# Patient Record
Sex: FEMALE | Race: BLACK OR AFRICAN AMERICAN | NOT HISPANIC OR LATINO
[De-identification: names, ages, dates, MRNs, and addresses within clinical notes are randomized per-mention and may not be internally consistent; named-entity substitution may affect disease eponyms.]

---

## 2018-03-08 PROBLEM — Z00.00 ENCOUNTER FOR PREVENTIVE HEALTH EXAMINATION: Status: ACTIVE | Noted: 2018-03-08

## 2018-03-14 ENCOUNTER — APPOINTMENT (OUTPATIENT)
Dept: BREAST CENTER | Facility: CLINIC | Age: 63
End: 2018-03-14
Payer: MEDICAID

## 2018-03-14 VITALS
HEIGHT: 66 IN | WEIGHT: 150 LBS | HEART RATE: 97 BPM | SYSTOLIC BLOOD PRESSURE: 157 MMHG | BODY MASS INDEX: 24.11 KG/M2 | TEMPERATURE: 97.6 F | DIASTOLIC BLOOD PRESSURE: 93 MMHG

## 2018-03-14 DIAGNOSIS — Z78.9 OTHER SPECIFIED HEALTH STATUS: ICD-10-CM

## 2018-03-14 PROCEDURE — 99205 OFFICE O/P NEW HI 60 MIN: CPT

## 2018-03-15 ENCOUNTER — OTHER (OUTPATIENT)
Age: 63
End: 2018-03-15

## 2018-03-26 ENCOUNTER — RESULT REVIEW (OUTPATIENT)
Age: 63
End: 2018-03-26

## 2018-03-26 ENCOUNTER — OUTPATIENT (OUTPATIENT)
Dept: OUTPATIENT SERVICES | Facility: HOSPITAL | Age: 63
LOS: 1 days | End: 2018-03-26
Payer: MEDICAID

## 2018-03-26 DIAGNOSIS — C50.912 MALIGNANT NEOPLASM OF UNSPECIFIED SITE OF LEFT FEMALE BREAST: ICD-10-CM

## 2018-03-26 PROCEDURE — 88321 CONSLTJ&REPRT SLD PREP ELSWR: CPT

## 2018-03-27 LAB — SURGICAL PATHOLOGY STUDY: SIGNIFICANT CHANGE UP

## 2018-04-04 ENCOUNTER — FORM ENCOUNTER (OUTPATIENT)
Age: 63
End: 2018-04-04

## 2018-04-05 ENCOUNTER — OUTPATIENT (OUTPATIENT)
Dept: OUTPATIENT SERVICES | Facility: HOSPITAL | Age: 63
LOS: 1 days | End: 2018-04-05
Payer: MEDICAID

## 2018-04-05 ENCOUNTER — APPOINTMENT (OUTPATIENT)
Dept: MRI IMAGING | Facility: HOSPITAL | Age: 63
End: 2018-04-05
Payer: MEDICAID

## 2018-04-05 ENCOUNTER — APPOINTMENT (OUTPATIENT)
Dept: ULTRASOUND IMAGING | Facility: HOSPITAL | Age: 63
End: 2018-04-05
Payer: MEDICAID

## 2018-04-05 ENCOUNTER — APPOINTMENT (OUTPATIENT)
Dept: MAMMOGRAPHY | Facility: HOSPITAL | Age: 63
End: 2018-04-05
Payer: MEDICAID

## 2018-04-05 PROCEDURE — 0159T: CPT | Mod: 26

## 2018-04-05 PROCEDURE — 76641 ULTRASOUND BREAST COMPLETE: CPT | Mod: 26,50

## 2018-04-05 PROCEDURE — 77065 DX MAMMO INCL CAD UNI: CPT | Mod: 26,LT

## 2018-04-05 PROCEDURE — 77059 MRI BREAST BILATERAL: CPT | Mod: 26

## 2018-04-06 PROCEDURE — 97116 GAIT TRAINING THERAPY: CPT

## 2018-04-06 PROCEDURE — A9585: CPT

## 2018-04-06 PROCEDURE — C8937: CPT

## 2018-04-06 PROCEDURE — 77049 MRI BREAST C-+ W/CAD BI: CPT

## 2018-04-06 PROCEDURE — 76641 ULTRASOUND BREAST COMPLETE: CPT

## 2018-04-06 PROCEDURE — 77065 DX MAMMO INCL CAD UNI: CPT

## 2018-04-09 ENCOUNTER — CHART COPY (OUTPATIENT)
Age: 63
End: 2018-04-09

## 2018-04-10 ENCOUNTER — CHART COPY (OUTPATIENT)
Age: 63
End: 2018-04-10

## 2018-04-12 ENCOUNTER — CHART COPY (OUTPATIENT)
Age: 63
End: 2018-04-12

## 2018-04-12 ENCOUNTER — FORM ENCOUNTER (OUTPATIENT)
Age: 63
End: 2018-04-12

## 2018-04-13 ENCOUNTER — OUTPATIENT (OUTPATIENT)
Dept: OUTPATIENT SERVICES | Facility: HOSPITAL | Age: 63
LOS: 1 days | End: 2018-04-13
Payer: MEDICAID

## 2018-04-13 ENCOUNTER — APPOINTMENT (OUTPATIENT)
Dept: ULTRASOUND IMAGING | Facility: HOSPITAL | Age: 63
End: 2018-04-13
Payer: MEDICAID

## 2018-04-13 ENCOUNTER — RESULT REVIEW (OUTPATIENT)
Age: 63
End: 2018-04-13

## 2018-04-13 PROCEDURE — 19083 BX BREAST 1ST LESION US IMAG: CPT | Mod: LT

## 2018-04-13 PROCEDURE — 77065 DX MAMMO INCL CAD UNI: CPT

## 2018-04-13 PROCEDURE — 88305 TISSUE EXAM BY PATHOLOGIST: CPT

## 2018-04-13 PROCEDURE — A4648: CPT

## 2018-04-13 PROCEDURE — 77065 DX MAMMO INCL CAD UNI: CPT | Mod: 26,LT

## 2018-04-13 PROCEDURE — 19083 BX BREAST 1ST LESION US IMAG: CPT

## 2018-04-17 LAB — SURGICAL PATHOLOGY STUDY: SIGNIFICANT CHANGE UP

## 2018-04-18 ENCOUNTER — CHART COPY (OUTPATIENT)
Age: 63
End: 2018-04-18

## 2018-04-18 ENCOUNTER — FORM ENCOUNTER (OUTPATIENT)
Age: 63
End: 2018-04-18

## 2018-04-18 NOTE — ASU PATIENT PROFILE, ADULT - TEACHING/LEARNING LEARNING PREFERENCES
written material/verbal instruction/individual instruction/audio written material/verbal instruction/individual instruction/skill demonstration/audio

## 2018-04-18 NOTE — ASU PATIENT PROFILE, ADULT - PSH
History of surgery  right index finger H/O left breast biopsy    History of surgery  right index finger

## 2018-04-19 ENCOUNTER — APPOINTMENT (OUTPATIENT)
Dept: ULTRASOUND IMAGING | Facility: HOSPITAL | Age: 63
End: 2018-04-19

## 2018-04-19 ENCOUNTER — RESULT REVIEW (OUTPATIENT)
Age: 63
End: 2018-04-19

## 2018-04-19 ENCOUNTER — APPOINTMENT (OUTPATIENT)
Dept: BREAST CENTER | Facility: HOSPITAL | Age: 63
End: 2018-04-19

## 2018-04-19 ENCOUNTER — OUTPATIENT (OUTPATIENT)
Dept: OUTPATIENT SERVICES | Facility: HOSPITAL | Age: 63
LOS: 1 days | Discharge: HOME | End: 2018-04-19
Payer: MEDICAID

## 2018-04-19 VITALS
SYSTOLIC BLOOD PRESSURE: 118 MMHG | DIASTOLIC BLOOD PRESSURE: 72 MMHG | HEART RATE: 76 BPM | OXYGEN SATURATION: 99 % | RESPIRATION RATE: 16 BRPM

## 2018-04-19 VITALS
HEART RATE: 80 BPM | DIASTOLIC BLOOD PRESSURE: 89 MMHG | SYSTOLIC BLOOD PRESSURE: 163 MMHG | OXYGEN SATURATION: 97 % | RESPIRATION RATE: 16 BRPM | WEIGHT: 160.5 LBS | HEIGHT: 66 IN | TEMPERATURE: 97 F

## 2018-04-19 DIAGNOSIS — Z98.890 OTHER SPECIFIED POSTPROCEDURAL STATES: Chronic | ICD-10-CM

## 2018-04-19 PROCEDURE — A4648: CPT

## 2018-04-19 PROCEDURE — 19285 PERQ DEV BREAST 1ST US IMAG: CPT | Mod: LT

## 2018-04-19 PROCEDURE — 88307 TISSUE EXAM BY PATHOLOGIST: CPT

## 2018-04-19 PROCEDURE — 19285 PERQ DEV BREAST 1ST US IMAG: CPT

## 2018-04-19 PROCEDURE — 77065 DX MAMMO INCL CAD UNI: CPT | Mod: 26,LT

## 2018-04-19 PROCEDURE — 77065 DX MAMMO INCL CAD UNI: CPT

## 2018-04-19 PROCEDURE — 14001 TIS TRNFR TRUNK 10.1-30SQCM: CPT | Mod: LT,GC

## 2018-04-19 PROCEDURE — 88360 TUMOR IMMUNOHISTOCHEM/MANUAL: CPT

## 2018-04-19 PROCEDURE — 88341 IMHCHEM/IMCYTCHM EA ADD ANTB: CPT

## 2018-04-19 PROCEDURE — 19499 UNLISTED PROCEDURE BREAST: CPT

## 2018-04-19 PROCEDURE — 19301 PARTIAL MASTECTOMY: CPT | Mod: LT,GC

## 2018-04-19 PROCEDURE — 76098 X-RAY EXAM SURGICAL SPECIMEN: CPT | Mod: 26

## 2018-04-19 PROCEDURE — 19125 EXCISION BREAST LESION: CPT | Mod: LT

## 2018-04-19 PROCEDURE — 76098 X-RAY EXAM SURGICAL SPECIMEN: CPT

## 2018-04-19 PROCEDURE — C1819: CPT

## 2018-04-19 RX ORDER — OXYCODONE HYDROCHLORIDE 5 MG/1
5 TABLET ORAL ONCE
Qty: 0 | Refills: 0 | Status: DISCONTINUED | OUTPATIENT
Start: 2018-04-19 | End: 2018-04-19

## 2018-04-19 RX ORDER — ONDANSETRON 8 MG/1
4 TABLET, FILM COATED ORAL
Qty: 0 | Refills: 0 | Status: DISCONTINUED | OUTPATIENT
Start: 2018-04-19 | End: 2018-04-19

## 2018-04-19 RX ADMIN — OXYCODONE HYDROCHLORIDE 5 MILLIGRAM(S): 5 TABLET ORAL at 16:38

## 2018-04-19 NOTE — BRIEF OPERATIVE NOTE - PROCEDURE
<<-----Click on this checkbox to enter Procedure Breast biopsy, left  04/19/2018    Active  [email or web address]

## 2018-04-19 NOTE — PACU DISCHARGE NOTE - COMMENTS
discharged instructions given by ALLIE ro  pt stable deneis any pain at this time  iv removed  pt left accompaneid by her family william

## 2018-04-24 PROBLEM — C50.919 MALIGNANT NEOPLASM OF UNSPECIFIED SITE OF UNSPECIFIED FEMALE BREAST: Chronic | Status: ACTIVE | Noted: 2018-04-18

## 2018-04-24 PROBLEM — I10 ESSENTIAL (PRIMARY) HYPERTENSION: Chronic | Status: ACTIVE | Noted: 2018-04-18

## 2018-04-25 LAB — SURGICAL PATHOLOGY STUDY: SIGNIFICANT CHANGE UP

## 2018-04-27 ENCOUNTER — APPOINTMENT (OUTPATIENT)
Dept: BREAST CENTER | Facility: CLINIC | Age: 63
End: 2018-04-27
Payer: MEDICAID

## 2018-04-27 VITALS
DIASTOLIC BLOOD PRESSURE: 88 MMHG | WEIGHT: 150 LBS | HEART RATE: 86 BPM | SYSTOLIC BLOOD PRESSURE: 134 MMHG | BODY MASS INDEX: 24.11 KG/M2 | HEIGHT: 66 IN

## 2018-04-27 DIAGNOSIS — R92.8 OTHER ABNORMAL AND INCONCLUSIVE FINDINGS ON DIAGNOSTIC IMAGING OF BREAST: ICD-10-CM

## 2018-04-27 PROCEDURE — 99024 POSTOP FOLLOW-UP VISIT: CPT

## 2018-05-02 ENCOUNTER — FORM ENCOUNTER (OUTPATIENT)
Age: 63
End: 2018-05-02

## 2018-05-03 ENCOUNTER — APPOINTMENT (OUTPATIENT)
Dept: MAMMOGRAPHY | Facility: HOSPITAL | Age: 63
End: 2018-05-03
Payer: MEDICAID

## 2018-05-03 ENCOUNTER — OUTPATIENT (OUTPATIENT)
Dept: OUTPATIENT SERVICES | Facility: HOSPITAL | Age: 63
LOS: 1 days | End: 2018-05-03
Payer: MEDICAID

## 2018-05-03 DIAGNOSIS — Z98.890 OTHER SPECIFIED POSTPROCEDURAL STATES: Chronic | ICD-10-CM

## 2018-05-03 PROCEDURE — 76641 ULTRASOUND BREAST COMPLETE: CPT | Mod: 26,LT

## 2018-05-03 PROCEDURE — 77065 DX MAMMO INCL CAD UNI: CPT

## 2018-05-03 PROCEDURE — 76641 ULTRASOUND BREAST COMPLETE: CPT

## 2018-05-03 PROCEDURE — G0279: CPT

## 2018-05-03 PROCEDURE — 77065 DX MAMMO INCL CAD UNI: CPT | Mod: 26,LT

## 2018-05-03 PROCEDURE — G0279: CPT | Mod: 26

## 2018-05-18 ENCOUNTER — APPOINTMENT (OUTPATIENT)
Age: 63
End: 2018-05-18
Payer: MEDICAID

## 2018-05-18 VITALS
BODY MASS INDEX: 26.36 KG/M2 | HEIGHT: 66 IN | HEART RATE: 79 BPM | WEIGHT: 164 LBS | OXYGEN SATURATION: 98 % | SYSTOLIC BLOOD PRESSURE: 125 MMHG | DIASTOLIC BLOOD PRESSURE: 86 MMHG | RESPIRATION RATE: 16 BRPM

## 2018-05-18 DIAGNOSIS — I10 ESSENTIAL (PRIMARY) HYPERTENSION: ICD-10-CM

## 2018-05-18 DIAGNOSIS — Z86.39 PERSONAL HISTORY OF OTHER ENDOCRINE, NUTRITIONAL AND METABOLIC DISEASE: ICD-10-CM

## 2018-05-18 PROCEDURE — 99204 OFFICE O/P NEW MOD 45 MIN: CPT | Mod: 25

## 2018-05-18 RX ORDER — ROSUVASTATIN CALCIUM 10 MG/1
10 TABLET, FILM COATED ORAL
Qty: 30 | Refills: 0 | Status: ACTIVE | COMMUNITY
Start: 2018-03-08

## 2018-05-18 RX ORDER — ROSUVASTATIN CALCIUM 5 MG/1
TABLET, FILM COATED ORAL
Refills: 0 | Status: DISCONTINUED | COMMUNITY
End: 2018-05-18

## 2018-06-13 RX ORDER — HYDROCODONE BITARTRATE AND ACETAMINOPHEN 5; 300 MG/1; MG/1
5-300 TABLET ORAL
Qty: 10 | Refills: 0 | Status: DISCONTINUED | COMMUNITY
Start: 2018-04-19 | End: 2018-06-13

## 2018-07-18 ENCOUNTER — APPOINTMENT (OUTPATIENT)
Dept: BREAST CENTER | Facility: CLINIC | Age: 63
End: 2018-07-18

## 2018-07-26 ENCOUNTER — APPOINTMENT (OUTPATIENT)
Dept: BREAST CENTER | Facility: CLINIC | Age: 63
End: 2018-07-26
Payer: MEDICAID

## 2018-07-26 VITALS
BODY MASS INDEX: 26.36 KG/M2 | DIASTOLIC BLOOD PRESSURE: 85 MMHG | SYSTOLIC BLOOD PRESSURE: 129 MMHG | HEART RATE: 84 BPM | WEIGHT: 164 LBS | HEIGHT: 66 IN

## 2018-07-26 DIAGNOSIS — Z12.31 ENCOUNTER FOR SCREENING MAMMOGRAM FOR MALIGNANT NEOPLASM OF BREAST: ICD-10-CM

## 2018-07-26 DIAGNOSIS — Z85.3 PERSONAL HISTORY OF MALIGNANT NEOPLASM OF BREAST: ICD-10-CM

## 2018-07-26 PROCEDURE — 99213 OFFICE O/P EST LOW 20 MIN: CPT

## 2018-08-22 ENCOUNTER — APPOINTMENT (OUTPATIENT)
Age: 63
End: 2018-08-22
Payer: MEDICAID

## 2018-08-22 PROCEDURE — 99024 POSTOP FOLLOW-UP VISIT: CPT

## 2018-08-22 RX ORDER — METOPROLOL SUCCINATE 25 MG/1
25 TABLET, EXTENDED RELEASE ORAL
Qty: 30 | Refills: 0 | Status: DISCONTINUED | COMMUNITY
Start: 2018-04-06 | End: 2018-08-22

## 2018-09-20 ENCOUNTER — APPOINTMENT (OUTPATIENT)
Dept: MAMMOGRAPHY | Facility: HOSPITAL | Age: 63
End: 2018-09-20
Payer: MEDICAID

## 2018-09-20 ENCOUNTER — OUTPATIENT (OUTPATIENT)
Dept: OUTPATIENT SERVICES | Facility: HOSPITAL | Age: 63
LOS: 1 days | End: 2018-09-20
Payer: MEDICAID

## 2018-09-20 ENCOUNTER — APPOINTMENT (OUTPATIENT)
Dept: ULTRASOUND IMAGING | Facility: HOSPITAL | Age: 63
End: 2018-09-20
Payer: MEDICAID

## 2018-09-20 DIAGNOSIS — Z98.890 OTHER SPECIFIED POSTPROCEDURAL STATES: Chronic | ICD-10-CM

## 2018-09-20 PROCEDURE — 77066 DX MAMMO INCL CAD BI: CPT

## 2018-09-20 PROCEDURE — 76641 ULTRASOUND BREAST COMPLETE: CPT

## 2018-09-20 PROCEDURE — 76641 ULTRASOUND BREAST COMPLETE: CPT | Mod: 26,50

## 2018-09-20 PROCEDURE — 77066 DX MAMMO INCL CAD BI: CPT | Mod: 26

## 2018-09-20 PROCEDURE — 77062 BREAST TOMOSYNTHESIS BI: CPT | Mod: 26

## 2018-09-20 PROCEDURE — G0279: CPT | Mod: 26

## 2018-09-20 PROCEDURE — G0279: CPT

## 2018-10-22 ENCOUNTER — CHART COPY (OUTPATIENT)
Age: 63
End: 2018-10-22

## 2018-11-01 ENCOUNTER — OUTPATIENT (OUTPATIENT)
Dept: OUTPATIENT SERVICES | Facility: HOSPITAL | Age: 63
LOS: 1 days | End: 2018-11-01
Payer: MEDICAID

## 2018-11-01 DIAGNOSIS — R00.2 PALPITATIONS: ICD-10-CM

## 2018-11-01 DIAGNOSIS — Z98.890 OTHER SPECIFIED POSTPROCEDURAL STATES: Chronic | ICD-10-CM

## 2018-11-01 PROCEDURE — 93306 TTE W/DOPPLER COMPLETE: CPT | Mod: 26

## 2018-11-01 PROCEDURE — 93306 TTE W/DOPPLER COMPLETE: CPT

## 2018-12-19 ENCOUNTER — APPOINTMENT (OUTPATIENT)
Dept: RADIATION ONCOLOGY | Facility: CLINIC | Age: 63
End: 2018-12-19
Payer: MEDICAID

## 2018-12-19 VITALS
SYSTOLIC BLOOD PRESSURE: 143 MMHG | WEIGHT: 161.4 LBS | DIASTOLIC BLOOD PRESSURE: 84 MMHG | RESPIRATION RATE: 16 BRPM | OXYGEN SATURATION: 98 % | BODY MASS INDEX: 26.05 KG/M2 | HEART RATE: 78 BPM

## 2018-12-19 PROCEDURE — 99214 OFFICE O/P EST MOD 30 MIN: CPT

## 2018-12-19 RX ORDER — NAPROXEN 500 MG/1
500 TABLET ORAL
Qty: 30 | Refills: 0 | Status: DISCONTINUED | COMMUNITY
Start: 2018-03-08 | End: 2018-12-19

## 2018-12-19 RX ORDER — TRAMADOL HYDROCHLORIDE 50 MG/1
50 TABLET, COATED ORAL
Refills: 0 | Status: ACTIVE | COMMUNITY
Start: 2018-12-19

## 2018-12-19 NOTE — VITALS
[Least Pain Intensity: 0/10] : 0/10 [90: Able to carry normal activity; minor signs or symptoms of disease.] : 90: Able to carry normal activity; minor signs or symptoms of disease.  [Maximal Pain Intensity: 7/10] : 7/10 [Pain Location: ___] : Pain Location: [unfilled] [NSAID/Non-Opioid] : NSAID/Non-Opioid

## 2018-12-20 NOTE — REVIEW OF SYSTEMS
[Edema Limbs: Grade 0] : Edema Limbs: Grade 0  [Fatigue: Grade 0] : Fatigue: Grade 0 [Localized Edema: Grade 0] : Localized Edema: Grade 0  [Breast Pain: Grade 0] : Breast Pain: Grade 0 [Negative] : Respiratory [Dysphagia] : no dysphagia [Skin Rash] : no skin rash [Swollen Glands] : no swollen glands [FreeTextEntry9] : as noted in HPI

## 2018-12-20 NOTE — DISEASE MANAGEMENT
[Pathological] : TNM Stage: p [0] : 0 [TTNM] : is [NTNM] : x [MTNM] : 0 [de-identified] : 5240 cGy [de-identified] : 5240 cGy [de-identified] : left breast

## 2018-12-20 NOTE — HISTORY OF PRESENT ILLNESS
[FreeTextEntry1] : Ms. Beckman completed radiation therapy to the left breast for a total of 5240 cGy/ 20 fractions from 6/7/18- 7/5/18.  She had an uncomplicated treatment course. \par \par 12/19/18- FOLLOW UP\par Ms. Beckman returns for routine follow up. When she was last seen on 8/22/18, she was doing well; plan was to continue follow up  with Talia Miranda and Saran._\par \par Bilateral mammo/ sono done 9/20/18 showed the following:\par 1. Postoperative and postradiation changes in the left breast. Continued six month follow up left breast mammogram is recommended.  BI-RADS 3. \par 2. Benign right breast mammographic findings without interval change. Negative right breast ultrasound.  \par \par Today, she notes induration left medial breast to palpation. Denies breast pain\par Notably, she states that she had a recent fall on a bus and has bilateral shoulder and left arm pain 7/10 for which she takes tramadol. She is receiving PT 2 x week. She has MRI  of neck/back  scheduled today.\par She states that she will follow up with Dr. Arenas in January.\par \par \par 8/22/18- PTE\par She saw Dr. Noonan on 7/26/18, plan was to do pratima/ sono in September. She met with medical oncologist, Dr. Miranda, and has decided against an AI at this time.\par \par Today, she reports she is feeling very well, energy level is good. Reports very mild breast pain at times, 1/10. Otherwise denies any symptoms to include fever, chills, cough, SOB. \par  \par \par ONCOLOGY HISTORY\par Ms. Leanne Beckman is a 63 year old female who presents today with  left breast DCIS\par On 8/14/17 pt had mammogram showing left breast microcalcifications. On 9/20/17 she had a left breast sono which showed a  0.4 x 0.4 x 0.4 cm calcification at 12:00 2 cm FN. On 12/12/17 she had a left breast biopsy at [facility].  Pathology  reviewed by [facility] showed DICS intermediate grade with central necrosis, ER/IN positive, Ki-67 3%. MRI done 4/5/18 showed a 1.4 cm enhancing lesion at 12;00, 5.7 cm FN. Linear non- mass enhancement extends anteriorly from lesion approximately 1.5 cm.\par \par She consulted with Dr. Noonan and had a repeat ultrasound guided left core biopsy to R/O an invasive component on 4/13/18  at 12:00, 5-7 cm FN which showed benign findings. She underwent  a left breast lumpectomy with Biozorb on 4/19/18. Pathology showed DCIS 1mm from inferior margin measuring 1.5 mm. Negative margins, nuclear grade ll-11, with necrosis. ER/IN positive. Immunochemical stains support DCIS. Notably Dr. Noonan recommended a reexcision as margins were less than 2 mm but pt. refused additional surgery. She also recommended a left mammo/sono as the area of concern was larger on her studies which showed \par was done on 5/3/18 which showed no residual lesion at the lumpectomy site.BI-RADS 3\par \par Today she feels well. She consulted with Dr. Miranda 5/17 who did not recommended chemo.

## 2018-12-20 NOTE — PHYSICAL EXAM
[General Appearance - Well Developed] : well developed [General Appearance - Alert] : alert [General Appearance - In No Acute Distress] : in no acute distress [de-identified] : slight darkening under left breast.  [] : no respiratory distress [Heart Rate And Rhythm] : heart rate and rhythm were normal [Heart Sounds] : normal S1 and S2 [Skin Color & Pigmentation] : normal skin color and pigmentation [Oriented To Time, Place, And Person] : oriented to person, place, and time [Cervical Lymph Nodes Enlarged Posterior Bilaterally] : posterior cervical [Cervical Lymph Nodes Enlarged Anterior Bilaterally] : anterior cervical [Supraclavicular Lymph Nodes Enlarged Bilaterally] : supraclavicular [Extraocular Movements] : extraocular movements were intact [Hearing Threshold Finger Rub Not [place]] : hearing was normal [Normal] : normal spine exam without palpable tenderness, no kyphosis or scoliosis [de-identified] :  Induration medial left breast to palpation, grade 1 hyperpigmentation, non tender, no dimpling

## 2019-04-30 ENCOUNTER — OUTPATIENT (OUTPATIENT)
Dept: OUTPATIENT SERVICES | Facility: HOSPITAL | Age: 64
LOS: 1 days | End: 2019-04-30
Payer: MEDICAID

## 2019-04-30 ENCOUNTER — APPOINTMENT (OUTPATIENT)
Age: 64
End: 2019-04-30

## 2019-04-30 DIAGNOSIS — Z98.890 OTHER SPECIFIED POSTPROCEDURAL STATES: Chronic | ICD-10-CM

## 2019-04-30 PROCEDURE — 77066 DX MAMMO INCL CAD BI: CPT

## 2019-04-30 PROCEDURE — G0279: CPT | Mod: 26

## 2019-04-30 PROCEDURE — 76641 ULTRASOUND BREAST COMPLETE: CPT | Mod: 26,50

## 2019-04-30 PROCEDURE — 76641 ULTRASOUND BREAST COMPLETE: CPT

## 2019-04-30 PROCEDURE — G0279: CPT

## 2019-04-30 PROCEDURE — 77066 DX MAMMO INCL CAD BI: CPT | Mod: 26

## 2019-10-01 ENCOUNTER — APPOINTMENT (OUTPATIENT)
Dept: RADIATION ONCOLOGY | Facility: CLINIC | Age: 64
End: 2019-10-01
Payer: MEDICAID

## 2019-10-01 DIAGNOSIS — C50.912 MALIGNANT NEOPLASM OF UNSPECIFIED SITE OF LEFT FEMALE BREAST: ICD-10-CM

## 2019-10-01 PROCEDURE — 99214 OFFICE O/P EST MOD 30 MIN: CPT

## 2019-10-01 NOTE — VITALS
[Maximal Pain Intensity: 7/10] : 7/10 [Least Pain Intensity: 0/10] : 0/10 [NSAID/Non-Opioid] : NSAID/Non-Opioid [90: Able to carry normal activity; minor signs or symptoms of disease.] : 90: Able to carry normal activity; minor signs or symptoms of disease.  [Pain Location: ___] : Pain Location: [unfilled]

## 2019-10-03 NOTE — PHYSICAL EXAM
[Hearing Threshold Finger Rub Not [place]] : hearing was normal [Extraocular Movements] : extraocular movements were intact [Cervical Lymph Nodes Enlarged Posterior Bilaterally] : posterior cervical [Supraclavicular Lymph Nodes Enlarged Bilaterally] : supraclavicular [Cervical Lymph Nodes Enlarged Anterior Bilaterally] : anterior cervical [Oriented To Time, Place, And Person] : oriented to person, place, and time [Outer Ear] : the ears and nose were normal in appearance [Normal] : normal heart rate and rhythm, normal S1 and S2, and no murmurs present [Breast Abnormal Lactation (Galactorrhea)] : no nipple discharge [No UE Edema] : there is no upper extremity edema [de-identified] :  Induration medial left breast to palpation, grade 1 hyperpigmentation.  [de-identified] : Grade 1 hyperpigmentation to left breast.

## 2019-10-03 NOTE — REVIEW OF SYSTEMS
[Edema Limbs: Grade 0] : Edema Limbs: Grade 0  [Localized Edema: Grade 0] : Localized Edema: Grade 0  [Fatigue: Grade 0] : Fatigue: Grade 0 [Breast Pain: Grade 0] : Breast Pain: Grade 0 [Negative] : Endocrine [Dysphagia] : no dysphagia [Skin Rash] : no skin rash [Swollen Glands] : no swollen glands [FreeTextEntry9] : as noted in HPI

## 2019-10-03 NOTE — HISTORY OF PRESENT ILLNESS
[FreeTextEntry1] : Ms. Beckman completed radiation therapy to the LEFT breast for a total of 5240 cGy over 20 fractions from 6/7/18- 7/5/18 for left DCIS- [id number], ER/ GA positive, AJCC stage 0.   \par \par 10/1/19- FOLLOW UP\par Ms. Beckman returns for routine follow up. When she was last seen on 12/19/18, she was doing well and ERI; plan was to continue follow up with Dr. Noonan. She last saw Dr. Noonan in May and will follow up again in November. As per patient, plan is for annual breast MRI. \par \par Bilateral mammo/ sono done 4/30/19 revealed the following: \par -Postoperative and postradiation changes to left breast. Continued 6 month follow up left breast mammo recommended. BI-RADS 3. \par -Benign right breast mammographic findings without interval change. Negative right breast ultrasound. Unremarkable left breast ultrasound. \par \par Today, she reports she feels well. She denies breast pain/ soreness, palpable masses, edema, CP, SOB, fever, chills, or any other complaints with the exception of left hand pain. She reports she fell several months ago and had left hand surgery in July. \par \par \par 12/19/18- FOLLOW UP\par Ms. Beckman returns for routine follow up. When she was last seen on 8/22/18, she was doing well; plan was to continue follow up  with Talia Miranda and Saran.\par \par Bilateral mammo/ sono done 9/20/18 showed the following:\par 1. Postoperative and postradiation changes in the left breast. Continued six month follow up left breast mammogram is recommended.  BI-RADS 3. \par 2. Benign right breast mammographic findings without interval change. Negative right breast ultrasound.  \par \par Today, she notes induration left medial breast to palpation. Denies breast pain\par Notably, she states that she had a recent fall on a bus and has bilateral shoulder and left arm pain 7/10 for which she takes tramadol. She is receiving PT 2 x week. She has MRI  of neck/back  scheduled today.\par She states that she will follow up with Dr. Noonan in January.\par \par \par 8/22/18- PTE\par She saw Dr. Noonan on 7/26/18, plan was to do pratima/ sono in September. She met with medical oncologist, Dr. Miranda, and has decided against an AI at this time.\par \par Today, she reports she is feeling very well, energy level is good. Reports very mild breast pain at times, 1/10. Otherwise denies any symptoms to include fever, chills, cough, SOB. \par  \par \par ONCOLOGY HISTORY\par Ms. Leanne Beckman is a 63 year old female who presents today with  left breast DCIS\par On 8/14/17 pt had mammogram showing left breast microcalcifications. On 9/20/17 she had a left breast sono which showed a  0.4 x 0.4 x 0.4 cm calcification at 12:00 2 cm FN. On 12/12/17 she had a left breast biopsy at [facility].  Pathology  reviewed by [facility] showed DICS intermediate grade with central necrosis, ER/GA positive, Ki-67 3%. MRI done 4/5/18 showed a 1.4 cm enhancing lesion at 12;00, 5.7 cm FN. Linear non- mass enhancement extends anteriorly from lesion approximately 1.5 cm.\par \par She consulted with Dr. Noonan and had a repeat ultrasound guided left core biopsy to R/O an invasive component on 4/13/18  at 12:00, 5-7 cm FN which showed benign findings. She underwent  a left breast lumpectomy with Biozorb on 4/19/18. Pathology showed DCIS 1mm from inferior margin measuring 1.5 mm. Negative margins, nuclear grade ll-11, with necrosis. ER/GA positive. Immunochemical stains support DCIS. Notably Dr. Noonan recommended a reexcision as margins were less than 2 mm but pt. refused additional surgery. She also recommended a left mammo/sono as the area of concern was larger on her studies which showed \par was done on 5/3/18 which showed no residual lesion at the lumpectomy site.BI-RADS 3\par \par Today she feels well. She consulted with Dr. Miranda 5/17 who did not recommended chemo.

## 2019-11-22 ENCOUNTER — APPOINTMENT (OUTPATIENT)
Dept: MRI IMAGING | Facility: HOSPITAL | Age: 64
End: 2019-11-22

## 2019-11-22 ENCOUNTER — OUTPATIENT (OUTPATIENT)
Dept: OUTPATIENT SERVICES | Facility: HOSPITAL | Age: 64
LOS: 1 days | End: 2019-11-22
Payer: COMMERCIAL

## 2019-11-22 DIAGNOSIS — Z98.890 OTHER SPECIFIED POSTPROCEDURAL STATES: Chronic | ICD-10-CM

## 2019-11-22 PROCEDURE — C8937: CPT

## 2019-11-22 PROCEDURE — A9585: CPT

## 2019-11-22 PROCEDURE — 77049 MRI BREAST C-+ W/CAD BI: CPT

## 2019-11-22 PROCEDURE — 77049 MRI BREAST C-+ W/CAD BI: CPT | Mod: 26

## 2020-03-31 NOTE — HISTORY OF PRESENT ILLNESS
[Medical Office: ([facility])___] : at ~his/her~ medical office located in [place] [FreeTextEntry1] : \par Ms. Beckman completed radiation therapy to the LEFT breast for a total of 5240 cGy over 20 fractions from 6/7/18- 7/5/18 for left DCIS- [id number], ER/ IN positive, AJCC stage 0.   \par \par 4/1/20 - FOLLOW-UP (TELEHEALTH)\par Ms. Beckman was last seen on 10/1/19, at which time she was doing well and ERI.  She last saw Dr. Noonan on ____.  Today, she \par \par MRI breasts 11/22/19 - IMPRESSION: Benign MRI findings in both breasts. No evidence of recurrence. BI-RADS 2: Benign findings. \par OTHER: Pooling of contrast in a dilated right axillary vein of uncertain clinical significance. \par \par 10/1/19- FOLLOW UP\par Ms. Beckman returns for routine follow up. When she was last seen on 12/19/18, she was doing well and ERI; plan was to continue follow up with Dr. Noonan. She last saw Dr. Noonan in May and will follow up again in November. As per patient, plan is for annual breast MRI. \par \par Bilateral mammo/ sono done 4/30/19 revealed the following: \par -Postoperative and postradiation changes to left breast. Continued 6 month follow up left breast mammo recommended. BI-RADS 3. \par -Benign right breast mammographic findings without interval change. Negative right breast ultrasound. Unremarkable left breast ultrasound. \par \par Today, she reports she feels well. She denies breast pain/ soreness, palpable masses, edema, CP, SOB, fever, chills, or any other complaints with the exception of left hand pain. She reports she fell several months ago and had left hand surgery in July. \par \par \par 12/19/18- FOLLOW UP\par Ms. Beckman returns for routine follow up. When she was last seen on 8/22/18, she was doing well; plan was to continue follow up  with Talia Miranda and Saran.\par \par Bilateral mammo/ sono done 9/20/18 showed the following:\par 1. Postoperative and postradiation changes in the left breast. Continued six month follow up left breast mammogram is recommended.  BI-RADS 3. \par 2. Benign right breast mammographic findings without interval change. Negative right breast ultrasound.  \par \par Today, she notes induration left medial breast to palpation. Denies breast pain\par Notably, she states that she had a recent fall on a bus and has bilateral shoulder and left arm pain 7/10 for which she takes tramadol. She is receiving PT 2 x week. She has MRI  of neck/back  scheduled today.\par She states that she will follow up with Dr. Noonan in January.\par \par \par 8/22/18- PTE\par She saw Dr. Noonan on 7/26/18, plan was to do pratima/ sono in September. She met with medical oncologist, Dr. Miranda, and has decided against an AI at this time.\par \par Today, she reports she is feeling very well, energy level is good. Reports very mild breast pain at times, 1/10. Otherwise denies any symptoms to include fever, chills, cough, SOB. \par  \par \par ONCOLOGY HISTORY\par Ms. Leanne Beckman is a 63 year old female who presents today with  left breast DCIS\par On 8/14/17 pt had mammogram showing left breast microcalcifications. On 9/20/17 she had a left breast sono which showed a  0.4 x 0.4 x 0.4 cm calcification at 12:00 2 cm FN. On 12/12/17 she had a left breast biopsy at [facility].  Pathology  reviewed by [facility] showed DICS intermediate grade with central necrosis, ER/IN positive, Ki-67 3%. MRI done 4/5/18 showed a 1.4 cm enhancing lesion at 12;00, 5.7 cm FN. Linear non- mass enhancement extends anteriorly from lesion approximately 1.5 cm.\par \par She consulted with Dr. Noonan and had a repeat ultrasound guided left core biopsy to R/O an invasive component on 4/13/18  at 12:00, 5-7 cm FN which showed benign findings. She underwent  a left breast lumpectomy with Biozorb on 4/19/18. Pathology showed DCIS 1mm from inferior margin measuring 1.5 mm. Negative margins, nuclear grade ll-11, with necrosis. ER/IN positive. Immunochemical stains support DCIS. Notably Dr. Noonan recommended a reexcision as margins were less than 2 mm but pt. refused additional surgery. She also recommended a left mammo/sono as the area of concern was larger on her studies which showed \par was done on 5/3/18 which showed no residual lesion at the lumpectomy site.BI-RADS 3\par \par Today she feels well. She consulted with Dr. Miranda 5/17 who did not recommended chemo.

## 2020-03-31 NOTE — DISEASE MANAGEMENT
[Pathological] : TNM Stage: p [TTNM] : is [NTNM] : x [MTNM] : 0 [0] : 0 [de-identified] : left breast

## 2020-03-31 NOTE — PHYSICAL EXAM
[Extraocular Movements] : extraocular movements were intact [Outer Ear] : the ears and nose were normal in appearance [Hearing Threshold Finger Rub Not [place]] : hearing was normal [Breast Abnormal Lactation (Galactorrhea)] : no nipple discharge [No UE Edema] : there is no upper extremity edema [Cervical Lymph Nodes Enlarged Posterior Bilaterally] : posterior cervical [Cervical Lymph Nodes Enlarged Anterior Bilaterally] : anterior cervical [Supraclavicular Lymph Nodes Enlarged Bilaterally] : supraclavicular [Normal] : normal spine exam without palpable tenderness, no kyphosis or scoliosis [Oriented To Time, Place, And Person] : oriented to person, place, and time [de-identified] :  Induration medial left breast to palpation, grade 1 hyperpigmentation.  [de-identified] : Grade 1 hyperpigmentation to left breast.

## 2020-03-31 NOTE — VITALS
[Maximal Pain Intensity: 7/10] : 7/10 [Least Pain Intensity: 0/10] : 0/10 [Pain Location: ___] : Pain Location: [unfilled] [NSAID/Non-Opioid] : NSAID/Non-Opioid [90: Able to carry normal activity; minor signs or symptoms of disease.] : 90: Able to carry normal activity; minor signs or symptoms of disease.

## 2020-03-31 NOTE — REVIEW OF SYSTEMS
[Dysphagia] : no dysphagia [Skin Rash] : no skin rash [Swollen Glands] : no swollen glands [Negative] : Endocrine [FreeTextEntry9] : as noted in HPI [Edema Limbs: Grade 0] : Edema Limbs: Grade 0  [Fatigue: Grade 0] : Fatigue: Grade 0 [Localized Edema: Grade 0] : Localized Edema: Grade 0  [Breast Pain: Grade 0] : Breast Pain: Grade 0

## 2020-04-01 ENCOUNTER — APPOINTMENT (OUTPATIENT)
Dept: RADIATION ONCOLOGY | Facility: CLINIC | Age: 65
End: 2020-04-01

## 2020-08-12 ENCOUNTER — RESULT REVIEW (OUTPATIENT)
Age: 65
End: 2020-08-12

## 2020-08-12 ENCOUNTER — APPOINTMENT (OUTPATIENT)
Dept: MAMMOGRAPHY | Facility: HOSPITAL | Age: 65
End: 2020-08-12
Payer: MEDICARE

## 2020-08-12 ENCOUNTER — APPOINTMENT (OUTPATIENT)
Dept: ULTRASOUND IMAGING | Facility: HOSPITAL | Age: 65
End: 2020-08-12

## 2020-08-12 ENCOUNTER — OUTPATIENT (OUTPATIENT)
Dept: OUTPATIENT SERVICES | Facility: HOSPITAL | Age: 65
LOS: 1 days | End: 2020-08-12
Payer: MEDICARE

## 2020-08-12 DIAGNOSIS — Z98.890 OTHER SPECIFIED POSTPROCEDURAL STATES: Chronic | ICD-10-CM

## 2020-08-12 PROCEDURE — 77066 DX MAMMO INCL CAD BI: CPT | Mod: 26

## 2020-08-12 PROCEDURE — 76641 ULTRASOUND BREAST COMPLETE: CPT

## 2020-08-12 PROCEDURE — G0279: CPT

## 2020-08-12 PROCEDURE — 76641 ULTRASOUND BREAST COMPLETE: CPT | Mod: 26,50

## 2020-08-12 PROCEDURE — 77066 DX MAMMO INCL CAD BI: CPT

## 2020-08-12 PROCEDURE — G0279: CPT | Mod: 26

## 2021-08-17 ENCOUNTER — OUTPATIENT (OUTPATIENT)
Dept: OUTPATIENT SERVICES | Facility: HOSPITAL | Age: 66
LOS: 1 days | End: 2021-08-17
Payer: MEDICARE

## 2021-08-17 ENCOUNTER — APPOINTMENT (OUTPATIENT)
Dept: ULTRASOUND IMAGING | Facility: HOSPITAL | Age: 66
End: 2021-08-17

## 2021-08-17 ENCOUNTER — APPOINTMENT (OUTPATIENT)
Dept: MAMMOGRAPHY | Facility: HOSPITAL | Age: 66
End: 2021-08-17

## 2021-08-17 DIAGNOSIS — Z98.890 OTHER SPECIFIED POSTPROCEDURAL STATES: Chronic | ICD-10-CM

## 2021-08-17 PROCEDURE — 77063 BREAST TOMOSYNTHESIS BI: CPT | Mod: 26

## 2021-08-17 PROCEDURE — 76641 ULTRASOUND BREAST COMPLETE: CPT

## 2021-08-17 PROCEDURE — 76641 ULTRASOUND BREAST COMPLETE: CPT | Mod: 26,50

## 2021-08-17 PROCEDURE — 77063 BREAST TOMOSYNTHESIS BI: CPT

## 2021-08-17 PROCEDURE — 77067 SCR MAMMO BI INCL CAD: CPT | Mod: 26

## 2021-08-17 PROCEDURE — 77067 SCR MAMMO BI INCL CAD: CPT

## 2022-08-18 ENCOUNTER — RESULT REVIEW (OUTPATIENT)
Age: 67
End: 2022-08-18

## 2022-08-18 ENCOUNTER — OUTPATIENT (OUTPATIENT)
Dept: OUTPATIENT SERVICES | Facility: HOSPITAL | Age: 67
LOS: 1 days | End: 2022-08-18
Payer: MEDICARE

## 2022-08-18 ENCOUNTER — APPOINTMENT (OUTPATIENT)
Dept: MAMMOGRAPHY | Facility: HOSPITAL | Age: 67
End: 2022-08-18

## 2022-08-18 DIAGNOSIS — Z98.890 OTHER SPECIFIED POSTPROCEDURAL STATES: Chronic | ICD-10-CM

## 2022-08-18 PROCEDURE — 77066 DX MAMMO INCL CAD BI: CPT | Mod: 26

## 2022-08-18 PROCEDURE — 76641 ULTRASOUND BREAST COMPLETE: CPT

## 2022-08-18 PROCEDURE — 76641 ULTRASOUND BREAST COMPLETE: CPT | Mod: 26,50

## 2022-08-18 PROCEDURE — G0279: CPT | Mod: 26

## 2022-08-18 PROCEDURE — 77066 DX MAMMO INCL CAD BI: CPT

## 2022-08-18 PROCEDURE — G0279: CPT

## 2022-10-27 ENCOUNTER — APPOINTMENT (OUTPATIENT)
Dept: MRI IMAGING | Facility: HOSPITAL | Age: 67
End: 2022-10-27

## 2023-01-21 NOTE — DISEASE MANAGEMENT
[Pathological] : TNM Stage: p [0] : 0 [TTNM] : is [NTNM] : x [MTNM] : 0 [de-identified] : left breast General Minocycline Counseling: Patient advised regarding possible photosensitivity and discoloration of the teeth, skin, lips, tongue and gums.  Patient instructed to avoid sunlight, if possible.  When exposed to sunlight, patients should wear protective clothing, sunglasses, and sunscreen.  The patient was instructed to call the office immediately if the following severe adverse effects occur:  hearing changes, easy bruising/bleeding, severe headache, or vision changes.  The patient verbalized understanding of the proper use and possible adverse effects of minocycline.  All of the patient's questions and concerns were addressed.

## 2023-04-12 ENCOUNTER — APPOINTMENT (OUTPATIENT)
Dept: MRI IMAGING | Facility: HOSPITAL | Age: 68
End: 2023-04-12

## 2023-04-12 ENCOUNTER — RESULT REVIEW (OUTPATIENT)
Age: 68
End: 2023-04-12

## 2023-04-12 ENCOUNTER — OUTPATIENT (OUTPATIENT)
Dept: OUTPATIENT SERVICES | Facility: HOSPITAL | Age: 68
LOS: 1 days | End: 2023-04-12
Payer: MEDICARE

## 2023-04-12 DIAGNOSIS — Z98.890 OTHER SPECIFIED POSTPROCEDURAL STATES: Chronic | ICD-10-CM

## 2023-04-12 PROCEDURE — A9585: CPT

## 2023-04-12 PROCEDURE — 77049 MRI BREAST C-+ W/CAD BI: CPT | Mod: 26

## 2023-04-12 PROCEDURE — C8908: CPT

## 2023-04-12 PROCEDURE — C8937: CPT

## 2024-03-20 ENCOUNTER — OUTPATIENT (OUTPATIENT)
Dept: OUTPATIENT SERVICES | Facility: HOSPITAL | Age: 69
LOS: 1 days | End: 2024-03-20
Payer: MEDICARE

## 2024-03-20 ENCOUNTER — APPOINTMENT (OUTPATIENT)
Dept: MAMMOGRAPHY | Facility: HOSPITAL | Age: 69
End: 2024-03-20
Payer: MEDICARE

## 2024-03-20 ENCOUNTER — APPOINTMENT (OUTPATIENT)
Dept: ULTRASOUND IMAGING | Facility: HOSPITAL | Age: 69
End: 2024-03-20
Payer: MEDICARE

## 2024-03-20 DIAGNOSIS — Z98.890 OTHER SPECIFIED POSTPROCEDURAL STATES: Chronic | ICD-10-CM

## 2024-03-20 PROCEDURE — 77063 BREAST TOMOSYNTHESIS BI: CPT | Mod: 26

## 2024-03-20 PROCEDURE — 76641 ULTRASOUND BREAST COMPLETE: CPT | Mod: 26,50

## 2024-03-20 PROCEDURE — 77063 BREAST TOMOSYNTHESIS BI: CPT

## 2024-03-20 PROCEDURE — 77067 SCR MAMMO BI INCL CAD: CPT

## 2024-03-20 PROCEDURE — 77067 SCR MAMMO BI INCL CAD: CPT | Mod: 26

## 2024-03-20 PROCEDURE — 76641 ULTRASOUND BREAST COMPLETE: CPT

## 2025-05-19 ENCOUNTER — APPOINTMENT (OUTPATIENT)
Dept: MRI IMAGING | Facility: HOSPITAL | Age: 70
End: 2025-05-19

## 2025-06-11 ENCOUNTER — APPOINTMENT (OUTPATIENT)
Dept: MAMMOGRAPHY | Facility: HOSPITAL | Age: 70
End: 2025-06-11

## 2025-06-11 ENCOUNTER — APPOINTMENT (OUTPATIENT)
Dept: ULTRASOUND IMAGING | Facility: HOSPITAL | Age: 70
End: 2025-06-11